# Patient Record
Sex: FEMALE | Race: WHITE | NOT HISPANIC OR LATINO | URBAN - METROPOLITAN AREA
[De-identification: names, ages, dates, MRNs, and addresses within clinical notes are randomized per-mention and may not be internally consistent; named-entity substitution may affect disease eponyms.]

---

## 2023-04-11 DIAGNOSIS — K21.9 CHRONIC GERD: ICD-10-CM

## 2023-04-11 RX ORDER — PANTOPRAZOLE SODIUM 40 MG/1
TABLET, DELAYED RELEASE ORAL
Qty: 90 TABLET | Refills: 0 | Status: SHIPPED | OUTPATIENT
Start: 2023-04-11 | End: 2023-07-19

## 2023-04-17 DIAGNOSIS — I10 HYPERTENSION, UNSPECIFIED TYPE: ICD-10-CM

## 2023-04-17 DIAGNOSIS — Z00.00 HEALTH CARE MAINTENANCE: ICD-10-CM

## 2023-04-17 RX ORDER — AMLODIPINE BESYLATE 5 MG/1
1 TABLET ORAL DAILY
COMMUNITY
Start: 2017-09-29 | End: 2023-04-17 | Stop reason: SDUPTHER

## 2023-04-17 RX ORDER — CITALOPRAM 20 MG/1
1 TABLET, FILM COATED ORAL DAILY
COMMUNITY
Start: 2014-05-23 | End: 2023-04-17 | Stop reason: SDUPTHER

## 2023-04-18 RX ORDER — CITALOPRAM 20 MG/1
20 TABLET, FILM COATED ORAL DAILY
Qty: 90 TABLET | Refills: 0 | Status: SHIPPED | OUTPATIENT
Start: 2023-04-18 | End: 2023-08-02 | Stop reason: SDUPTHER

## 2023-04-18 RX ORDER — AMLODIPINE BESYLATE 5 MG/1
5 TABLET ORAL DAILY
Qty: 90 TABLET | Refills: 0 | Status: SHIPPED | OUTPATIENT
Start: 2023-04-18 | End: 2023-08-02 | Stop reason: SDUPTHER

## 2023-07-11 DIAGNOSIS — K21.9 CHRONIC GERD: ICD-10-CM

## 2023-07-19 RX ORDER — PANTOPRAZOLE SODIUM 40 MG/1
TABLET, DELAYED RELEASE ORAL
Qty: 90 TABLET | Refills: 3 | Status: SHIPPED | OUTPATIENT
Start: 2023-07-19 | End: 2024-05-13

## 2023-07-31 ENCOUNTER — LAB (OUTPATIENT)
Dept: LAB | Facility: LAB | Age: 68
End: 2023-07-31
Payer: MEDICARE

## 2023-07-31 ENCOUNTER — OFFICE VISIT (OUTPATIENT)
Dept: PRIMARY CARE | Facility: CLINIC | Age: 68
End: 2023-07-31
Payer: MEDICARE

## 2023-07-31 VITALS
HEIGHT: 64 IN | SYSTOLIC BLOOD PRESSURE: 150 MMHG | DIASTOLIC BLOOD PRESSURE: 79 MMHG | BODY MASS INDEX: 34.31 KG/M2 | OXYGEN SATURATION: 98 % | HEART RATE: 64 BPM | WEIGHT: 201 LBS | TEMPERATURE: 96.9 F

## 2023-07-31 DIAGNOSIS — K21.9 GASTROESOPHAGEAL REFLUX DISEASE WITHOUT ESOPHAGITIS: ICD-10-CM

## 2023-07-31 DIAGNOSIS — Z12.31 ENCOUNTER FOR SCREENING MAMMOGRAM FOR MALIGNANT NEOPLASM OF BREAST: ICD-10-CM

## 2023-07-31 DIAGNOSIS — I10 HYPERTENSION, BENIGN: ICD-10-CM

## 2023-07-31 DIAGNOSIS — F33.2 DEPRESSION, ENDOGENOUS (MULTI): ICD-10-CM

## 2023-07-31 DIAGNOSIS — M81.0 POSTMENOPAUSAL BONE LOSS: ICD-10-CM

## 2023-07-31 DIAGNOSIS — Z00.00 VISIT FOR PREVENTIVE HEALTH EXAMINATION: ICD-10-CM

## 2023-07-31 DIAGNOSIS — Z00.00 VISIT FOR PREVENTIVE HEALTH EXAMINATION: Primary | ICD-10-CM

## 2023-07-31 PROBLEM — M51.379 DEGENERATION OF INTERVERTEBRAL DISC OF LUMBOSACRAL REGION: Status: ACTIVE | Noted: 2023-07-31

## 2023-07-31 PROBLEM — E78.5 HYPERLIPIDEMIA: Status: ACTIVE | Noted: 2023-07-31

## 2023-07-31 PROBLEM — F41.9 ANXIETY: Status: ACTIVE | Noted: 2023-07-31

## 2023-07-31 PROBLEM — G47.33 OSA (OBSTRUCTIVE SLEEP APNEA): Status: ACTIVE | Noted: 2023-07-31

## 2023-07-31 PROBLEM — M51.37 DEGENERATION OF INTERVERTEBRAL DISC OF LUMBOSACRAL REGION: Status: ACTIVE | Noted: 2023-07-31

## 2023-07-31 LAB
ALANINE AMINOTRANSFERASE (SGPT) (U/L) IN SER/PLAS: 14 U/L (ref 7–45)
ALBUMIN (G/DL) IN SER/PLAS: 4.2 G/DL (ref 3.4–5)
ALKALINE PHOSPHATASE (U/L) IN SER/PLAS: 109 U/L (ref 33–136)
ANION GAP IN SER/PLAS: 12 MMOL/L (ref 10–20)
ASPARTATE AMINOTRANSFERASE (SGOT) (U/L) IN SER/PLAS: 15 U/L (ref 9–39)
BILIRUBIN TOTAL (MG/DL) IN SER/PLAS: 0.7 MG/DL (ref 0–1.2)
CALCIUM (MG/DL) IN SER/PLAS: 9.6 MG/DL (ref 8.6–10.6)
CARBON DIOXIDE, TOTAL (MMOL/L) IN SER/PLAS: 28 MMOL/L (ref 21–32)
CHLORIDE (MMOL/L) IN SER/PLAS: 102 MMOL/L (ref 98–107)
CHOLESTEROL (MG/DL) IN SER/PLAS: 194 MG/DL (ref 0–199)
CHOLESTEROL IN HDL (MG/DL) IN SER/PLAS: 44.4 MG/DL
CHOLESTEROL/HDL RATIO: 4.4
CREATININE (MG/DL) IN SER/PLAS: 0.74 MG/DL (ref 0.5–1.05)
ERYTHROCYTE DISTRIBUTION WIDTH (RATIO) BY AUTOMATED COUNT: 16 % (ref 11.5–14.5)
ERYTHROCYTE MEAN CORPUSCULAR HEMOGLOBIN CONCENTRATION (G/DL) BY AUTOMATED: 29.1 G/DL (ref 32–36)
ERYTHROCYTE MEAN CORPUSCULAR VOLUME (FL) BY AUTOMATED COUNT: 66 FL (ref 80–100)
ERYTHROCYTES (10*6/UL) IN BLOOD BY AUTOMATED COUNT: 5.78 X10E12/L (ref 4–5.2)
GFR FEMALE: 88 ML/MIN/1.73M2
GLUCOSE (MG/DL) IN SER/PLAS: 97 MG/DL (ref 74–99)
HEMATOCRIT (%) IN BLOOD BY AUTOMATED COUNT: 38.2 % (ref 36–46)
HEMOGLOBIN (G/DL) IN BLOOD: 11.1 G/DL (ref 12–16)
LDL: 124 MG/DL (ref 0–99)
LEUKOCYTES (10*3/UL) IN BLOOD BY AUTOMATED COUNT: 7.4 X10E9/L (ref 4.4–11.3)
NRBC (PER 100 WBCS) BY AUTOMATED COUNT: 0 /100 WBC (ref 0–0)
PLATELETS (10*3/UL) IN BLOOD AUTOMATED COUNT: 337 X10E9/L (ref 150–450)
POTASSIUM (MMOL/L) IN SER/PLAS: 4.6 MMOL/L (ref 3.5–5.3)
PROTEIN TOTAL: 6.8 G/DL (ref 6.4–8.2)
SODIUM (MMOL/L) IN SER/PLAS: 137 MMOL/L (ref 136–145)
THYROTROPIN (MIU/L) IN SER/PLAS BY DETECTION LIMIT <= 0.05 MIU/L: 0.92 MIU/L (ref 0.44–3.98)
TRIGLYCERIDE (MG/DL) IN SER/PLAS: 130 MG/DL (ref 0–149)
UREA NITROGEN (MG/DL) IN SER/PLAS: 13 MG/DL (ref 6–23)
VLDL: 26 MG/DL (ref 0–40)

## 2023-07-31 PROCEDURE — 3077F SYST BP >= 140 MM HG: CPT | Performed by: FAMILY MEDICINE

## 2023-07-31 PROCEDURE — G0439 PPPS, SUBSEQ VISIT: HCPCS | Performed by: FAMILY MEDICINE

## 2023-07-31 PROCEDURE — 1160F RVW MEDS BY RX/DR IN RCRD: CPT | Performed by: FAMILY MEDICINE

## 2023-07-31 PROCEDURE — 1036F TOBACCO NON-USER: CPT | Performed by: FAMILY MEDICINE

## 2023-07-31 PROCEDURE — 1159F MED LIST DOCD IN RCRD: CPT | Performed by: FAMILY MEDICINE

## 2023-07-31 PROCEDURE — G0444 DEPRESSION SCREEN ANNUAL: HCPCS | Performed by: FAMILY MEDICINE

## 2023-07-31 PROCEDURE — 99214 OFFICE O/P EST MOD 30 MIN: CPT | Performed by: FAMILY MEDICINE

## 2023-07-31 PROCEDURE — 1157F ADVNC CARE PLAN IN RCRD: CPT | Performed by: FAMILY MEDICINE

## 2023-07-31 PROCEDURE — 36415 COLL VENOUS BLD VENIPUNCTURE: CPT

## 2023-07-31 PROCEDURE — 84443 ASSAY THYROID STIM HORMONE: CPT

## 2023-07-31 PROCEDURE — 3078F DIAST BP <80 MM HG: CPT | Performed by: FAMILY MEDICINE

## 2023-07-31 PROCEDURE — 1170F FXNL STATUS ASSESSED: CPT | Performed by: FAMILY MEDICINE

## 2023-07-31 PROCEDURE — 99497 ADVNCD CARE PLAN 30 MIN: CPT | Performed by: FAMILY MEDICINE

## 2023-07-31 PROCEDURE — 80053 COMPREHEN METABOLIC PANEL: CPT

## 2023-07-31 PROCEDURE — 85027 COMPLETE CBC AUTOMATED: CPT

## 2023-07-31 PROCEDURE — 80061 LIPID PANEL: CPT

## 2023-07-31 RX ORDER — ALBUTEROL SULFATE 90 UG/1
2 AEROSOL, METERED RESPIRATORY (INHALATION) EVERY 6 HOURS PRN
COMMUNITY
Start: 2022-10-29

## 2023-07-31 ASSESSMENT — LIFESTYLE VARIABLES
SKIP TO QUESTIONS 9-10: 1
HOW MANY STANDARD DRINKS CONTAINING ALCOHOL DO YOU HAVE ON A TYPICAL DAY: 1 OR 2
AUDIT-C TOTAL SCORE: 3
HOW OFTEN DO YOU HAVE A DRINK CONTAINING ALCOHOL: 2-3 TIMES A WEEK
HOW OFTEN DO YOU HAVE SIX OR MORE DRINKS ON ONE OCCASION: NEVER

## 2023-07-31 ASSESSMENT — ACTIVITIES OF DAILY LIVING (ADL)
BATHING: INDEPENDENT
DRESSING: INDEPENDENT
TAKING_MEDICATION: INDEPENDENT
GROCERY_SHOPPING: INDEPENDENT
DOING_HOUSEWORK: INDEPENDENT
MANAGING_FINANCES: INDEPENDENT

## 2023-07-31 ASSESSMENT — PATIENT HEALTH QUESTIONNAIRE - PHQ9
1. LITTLE INTEREST OR PLEASURE IN DOING THINGS: NOT AT ALL
2. FEELING DOWN, DEPRESSED OR HOPELESS: NOT AT ALL
SUM OF ALL RESPONSES TO PHQ9 QUESTIONS 1 AND 2: 0

## 2023-07-31 ASSESSMENT — ENCOUNTER SYMPTOMS
LOSS OF SENSATION IN FEET: 0
DEPRESSION: 0
OCCASIONAL FEELINGS OF UNSTEADINESS: 0

## 2023-07-31 NOTE — PROGRESS NOTES
Subjective   Patient ID: Zoe Rousseau is a 67 y.o. female who presents for Medicare Annual Wellness Visit Subsequent.    Assessment/Plan     Problem List Items Addressed This Visit       Depression, endogenous (CMS/HCC)    GERD (gastroesophageal reflux disease)    Hypertension, benign    Relevant Orders    TSH with reflex to Free T4 if abnormal    Lipid Panel    Comprehensive Metabolic Panel    CBC    Urinalysis with Reflex Microscopic     Other Visit Diagnoses       Visit for preventive health examination    -  Primary    Relevant Orders    TSH with reflex to Free T4 if abnormal    Lipid Panel    Comprehensive Metabolic Panel    CBC    Urinalysis with Reflex Microscopic    Encounter for screening mammogram for malignant neoplasm of breast        Relevant Orders    BI mammo bilateral screening tomosynthesis    Postmenopausal bone loss        Relevant Orders    XR DEXA bone density        Nuclear stress test done in July 2022 no acute abnormality low risk scan  Discussed about weight loss  Mammogram DEXA scan requisition provided       Discussed briefly about rectocele patient can take Metamucil if not improving patient is to see urology or her gynecologist     Discussed about diet exercise weight loss  Pneumovax on previous visit  Shingles vaccine advised to get it at pharmacy after getting covid vaccine  Mammogram March 2021 within normal limits  Bone density March 2021 showed osteopenia  Colon cancer screening colonoscopy in September 2020  Chronic lower back pain  Follow-up in 6-12 months come back early with any new signs and symptoms. Patient understands plan.    HPI    66-year-old female here c/o shortness of breath since last 4-5 weeks n no chest pain chest tightness orthopnea PND palpitation      Nuclear stress test within normal limits  Spirometry shows possible restriction     possible rectocele she has advised to see urology gynecology     Hypertension  Obesity BMI 34  Prediabetes   Anxiety  "  Hyperlipidemia   Vitamin D deficiency     Chronic low back pain     Following up with OB/GYN.  Planing of on and off chronic lower back pain without radiation no history of injury or trauma no weakness noting and numbness no bowel or bladder incontinence     Recently had a EGD colonoscopy done August 2018 with Dr. Huang    I spent >16 minutes discussing Advance Care Planning including the explanation and discussion of advance directives. If patient does not have current up to date documents, examples and information provided on how to create both Living Will and Power of . Patient was encouraged to work on completing these documents. . Patient has living will and her  is healthcare power of . Patient is full      No Known Allergies    Current Outpatient Medications   Medication Sig Dispense Refill    albuterol 90 mcg/actuation inhaler Inhale 2 puffs every 6 hours if needed.      amLODIPine (Norvasc) 5 mg tablet Take 1 tablet (5 mg) by mouth once daily. 90 tablet 0    citalopram (CeleXA) 20 mg tablet Take 1 tablet (20 mg) by mouth once daily. 90 tablet 0    pantoprazole (ProtoNix) 40 mg EC tablet TAKE 1 TABLET BY MOUTH DAILY 90 tablet 3     No current facility-administered medications for this visit.       Objective   Visit Vitals  /79 (BP Location: Left arm, Patient Position: Sitting)   Pulse 64   Temp 36.1 °C (96.9 °F)   Ht 1.626 m (5' 4\")   Wt 91.2 kg (201 lb)   SpO2 98%   BMI 34.50 kg/m²   Smoking Status Never   BSA 2.03 m²     Physical Exam  Constitutional:       General: He is not in acute distress.     Appearance: Normal appearance.   HENT:      Head: Normocephalic and atraumatic.      Nose: Nose normal.   Eyes:      Extraocular Movements: Extraocular movements intact.      Conjunctiva/sclera: Conjunctivae normal.   Cardiovascular:      Rate and Rhythm: Normal rate and regular rhythm.   Pulmonary:      Effort: Pulmonary effort is normal.      Breath sounds: Normal breath " sounds.   Skin:     General: Skin is warm.   Neurological:      Mental Status: He is alert and oriented to person, place, and time.   Psychiatric:         Mood and Affect: Mood normal.         Behavior: Behavior normal.   Immunization History   Administered Date(s) Administered    Pfizer Purple Cap SARS-CoV-2 01/13/2021, 02/03/2021, 10/01/2021       Review of Systems    No visits with results within 4 Month(s) from this visit.   Latest known visit with results is:   Legacy Encounter on 08/02/2022   Component Date Value Ref Range Status    Glucose 08/02/2022 89  74 - 99 mg/dL Final    Sodium 08/02/2022 136  136 - 145 mmol/L Final    Potassium 08/02/2022 4.1  3.5 - 5.3 mmol/L Final    Chloride 08/02/2022 102  98 - 107 mmol/L Final    Bicarbonate 08/02/2022 27  21 - 32 mmol/L Final    Anion Gap 08/02/2022 11  10 - 20 mmol/L Final    Urea Nitrogen 08/02/2022 21  6 - 23 mg/dL Final    Creatinine 08/02/2022 0.89  0.50 - 1.05 mg/dL Final    GFR Female 08/02/2022 71  >90 mL/min/1.73m2 Final    Calcium 08/02/2022 8.7  8.6 - 10.6 mg/dL Final    Retic % 08/02/2022 1.8  0.5 - 2.0 % Final    Retic Absolute 08/02/2022 0.100  0.017 - 0.110 x10E12/L Final    Immature Retic fraction 08/02/2022 10.7  0.0 - 16.0 % Final    Reticulocyte Hemoglobin 08/02/2022 22 (L)  28 - 38 pg Final    Iron 08/02/2022 65  35 - 150 ug/dL Final    TIBC 08/02/2022 309  240 - 445 ug/dL Final    Iron Saturation 08/02/2022 21 (L)  25 - 45 % Final    WBC 08/02/2022 9.4  4.4 - 11.3 x10E9/L Final    nRBC 08/02/2022 0.0  0.0 - 0.0 /100 WBC Final    RBC 08/02/2022 5.54 (H)  4.00 - 5.20 x10E12/L Final    Hemoglobin 08/02/2022 10.6 (L)  12.0 - 16.0 g/dL Final    Hematocrit 08/02/2022 36.1  36.0 - 46.0 % Final    MCV 08/02/2022 65 (L)  80 - 100 fL Final    MCHC 08/02/2022 29.4 (L)  32.0 - 36.0 g/dL Final    Platelets 08/02/2022 324  150 - 450 x10E9/L Final    RDW 08/02/2022 16.2 (H)  11.5 - 14.5 % Final    Ferritin 08/02/2022 111  8 - 150 ug/L Final       Radiology:  Reviewed imaging in powerchart.  No results found.    No family history on file.  Social History     Socioeconomic History    Marital status:      Spouse name: None    Number of children: None    Years of education: None    Highest education level: None   Occupational History    None   Tobacco Use    Smoking status: Never    Smokeless tobacco: Never   Substance and Sexual Activity    Alcohol use: Not Currently    Drug use: Never    Sexual activity: None   Other Topics Concern    None   Social History Narrative    None     Social Determinants of Health     Financial Resource Strain: Not on file   Food Insecurity: Not on file   Transportation Needs: Not on file   Physical Activity: Not on file   Stress: Not on file   Social Connections: Not on file   Intimate Partner Violence: Not on file   Housing Stability: Not on file     Past Medical History:   Diagnosis Date    Other intervertebral disc degeneration, lumbosacral region     DDD (degenerative disc disease), lumbosacral    Personal history of colonic polyps     History of colonic polyps    Personal history of diseases of the blood and blood-forming organs and certain disorders involving the immune mechanism     History of thalassemia    Personal history of other diseases of the circulatory system     History of valvular heart disease    Personal history of other diseases of the nervous system and sense organs     History of sciatica    Personal history of other specified conditions 03/25/2015    History of vertigo    Personal history of urinary (tract) infections 11/10/2015    History of urinary tract infection    Primary osteoarthritis, right ankle and foot     Osteoarthritis of both feet     Past Surgical History:   Procedure Laterality Date    CHOLECYSTECTOMY  03/13/2015    Cholecystectomy Laparoscopic    COLONOSCOPY  08/29/2018    Complete Colonoscopy    OTHER SURGICAL HISTORY  08/29/2018    Upper Gastrointestinal Endoscopy, Simple Primary Exam    OTHER  SURGICAL HISTORY  08/03/2015    Dental Surgery    TOTAL ABDOMINAL HYSTERECTOMY  08/03/2015    Total Abdominal Hysterectomy       Charting was completed using voice recognition technology and may include unintended errors.

## 2023-08-01 ENCOUNTER — TELEPHONE (OUTPATIENT)
Dept: PRIMARY CARE | Facility: CLINIC | Age: 68
End: 2023-08-01
Payer: MEDICARE

## 2023-08-01 DIAGNOSIS — I10 HYPERTENSION, UNSPECIFIED TYPE: ICD-10-CM

## 2023-08-01 DIAGNOSIS — Z00.00 HEALTH CARE MAINTENANCE: ICD-10-CM

## 2023-08-01 DIAGNOSIS — D50.8 OTHER IRON DEFICIENCY ANEMIA: ICD-10-CM

## 2023-08-02 NOTE — TELEPHONE ENCOUNTER
PT stated she is going to try and work on losing weight first before taking any medication.       Also she is wanting to remind you about the meds she needs refilled.

## 2023-08-03 RX ORDER — CITALOPRAM 20 MG/1
20 TABLET, FILM COATED ORAL DAILY
Qty: 90 TABLET | Refills: 2 | Status: SHIPPED | OUTPATIENT
Start: 2023-08-03 | End: 2024-03-20

## 2023-08-03 RX ORDER — AMLODIPINE BESYLATE 5 MG/1
5 TABLET ORAL DAILY
Qty: 90 TABLET | Refills: 2 | Status: SHIPPED | OUTPATIENT
Start: 2023-08-03 | End: 2024-03-20

## 2023-08-24 ENCOUNTER — PATIENT OUTREACH (OUTPATIENT)
Dept: CARE COORDINATION | Facility: CLINIC | Age: 68
End: 2023-08-24
Payer: MEDICARE

## 2024-01-12 ENCOUNTER — OFFICE VISIT (OUTPATIENT)
Dept: DERMATOLOGY | Facility: CLINIC | Age: 69
End: 2024-01-12
Payer: MEDICARE

## 2024-01-12 DIAGNOSIS — C44.311 BASAL CELL CARCINOMA (BCC) OF SKIN OF NOSE: ICD-10-CM

## 2024-01-12 PROCEDURE — 1160F RVW MEDS BY RX/DR IN RCRD: CPT | Performed by: DERMATOLOGY

## 2024-01-12 PROCEDURE — 17312 MOHS ADDL STAGE: CPT | Performed by: STUDENT IN AN ORGANIZED HEALTH CARE EDUCATION/TRAINING PROGRAM

## 2024-01-12 PROCEDURE — 15576 PEDICLE E/N/E/L/NTRORAL: CPT | Performed by: STUDENT IN AN ORGANIZED HEALTH CARE EDUCATION/TRAINING PROGRAM

## 2024-01-12 PROCEDURE — 1036F TOBACCO NON-USER: CPT | Performed by: DERMATOLOGY

## 2024-01-12 PROCEDURE — 17311 MOHS 1 STAGE H/N/HF/G: CPT | Performed by: STUDENT IN AN ORGANIZED HEALTH CARE EDUCATION/TRAINING PROGRAM

## 2024-01-12 NOTE — PROGRESS NOTES
Mohs Surgery Operative Note    Date of Surgery:  1/12/2024  Surgeon:  Jose F Bhandari MD PhD  Office Location:  [address]  [facility] [address]  Dept: [phone]  Dept Fax: [phone]  Loc: [phone]  Referring Provider: Anaid Salguero,   [address] [facility] [address], [facility] [address]  [place] [address]      Assessment/Plan   Pre-procedure:   Obtained informed consent: written from patient  The surgical site was identified and confirmed with the patient.     Intra-operative:   Audible time out called at : 8:50 AM 01/16/24  by: Jose F Bhandari MD PhD   Verified patient name, birthdate, site, specimen bottle label & requisition.    The planned procedure(s) was again reviewed with the patient. The risks of bleeding, infection, nerve damage and scarring were reviewed. Written authorization was obtained. The patient identity, surgical site, and planned procedure(s) were verified. The provider acted as both surgeon and pathologist.     Basal cell carcinoma (BCC) of skin of nose  Right Ala Nasi    Mohs surgery    Consent obtained: written    Universal Protocol:  Procedure explained and questions answered to patient or proxy's satisfaction: Yes    Test results available and properly labeled: Yes    Pathology report reviewed: Yes    External notes reviewed: Yes    Photo or diagram used for site identification: Yes    Site/side marked: Yes    Slide independently reviewed by Mohs surgeon: Yes    Immediately prior to procedure a time out was called: Yes    Patient identity confirmed: verbally with patient  Preparation: Patient was prepped and draped in usual sterile fashion      Anticoagulation:  Was the anticoagulation regimen changed prior to Mohs? No      Anesthesia:  Anesthesia method: local infiltration  Local anesthetic: lidocaine 1% WITH epi    Procedure Details:  Biopsy accession number: [id number]  Date of biopsy: 6/6/2023  Frozen section biopsy performed: No    Pre-Op  diagnosis: basal cell carcinoma  BCC subtype: nodular  Surgery side: right  Surgical site (from skin exam): Right Ala Nasi  Pre-operative length (cm): 0.3  Pre-operative width (cm): 0.3  Indications for Mohs surgery: anatomic location where tissue conservation is critical    Micrographic Surgery Details:  Post-operative length (cm): 1.4  Post-operative width (cm): 1.6  Number of Mohs stages: 3    Stage 1     Comments: The patient was brought into the operating room and placed in the procedure chair in the appropriate position.  The area positive by previous biopsy was identified and confirmed with the patient. The area of clinically obvious tumor was debulked using a curette and/or scalpel as needed. An incision was made following the Mohs approach through the skin. The specimen was taken to the lab, divided into 2 piece(s) and appropriately chromacoded and processed.    .  Tumor was seen on the deep and lateral margins as indicated on the on the Mohs map.  Micronodular basal cell carcinoma. Histologic examination revealed multiple small tumor aggregates of atypical basaloid cells with peripheral palisading.          Tumor features identified on Mohs section: basal carcinoma      Depth of invasion: subcutaneous fat    Stage 2     Comments: The area of positivity as noted on the Mohs map in the previous stage was identified and removed using the Mohs technique. The specimen was taken to the lab and appropriately chromacoded and processed in 1 piece(s).    .  Tumor was seen on both the lateral and deep margins as indicated on the on the Mohs map.  Micronodular basal cell carcinoma. Histologic examination revealed multiple small tumor aggregates of atypical basaloid cells with peripheral palisading.        Tumor features identified on Mohs section: basal carcinoma     Depth of invasion: subcutaneous fat    Stage 3     Comments: The area of positivity as noted on the Mohs map in the previous stage was identified and  removed using the Mohs technique. The specimen was taken to the lab and appropriately chromacoded and processed in 1 piece(s).               Tumor features identified on Mohs section: no tumor identified     Depth of invasion: subcutaneous fat    Patient tolerance of procedure: tolerated well, no immediate complications    Reconstruction:  Was the defect reconstructed? Yes    Was reconstruction performed by the same Mohs surgeon? Yes    Setting of reconstruction: outpatient office  When was reconstruction performed? same day  Type of reconstruction: flap  Type of flap: interpolation    Interpolation flap type: two-stage melolabial  Flap area (cm2): 17  Subcutaneous Layers (Deep Stitches)   Suture size:  5-0  Suture type:  Vicryl  Stitches:  Buried vertical mattress  Fine/surface layer approximation (top stitches)   Epidermal/Superficial suture size:  5-0  Epidermal/Superficial suture type:  Fast-absorbing gut  Stitches: simple running    Hemostasis achieved with: electrodesiccation  Outcome: patient tolerated procedure well with no complications    Post-procedure details: sterile dressing applied and wound care instructions given    Dressing type: petrolatum, Telfa pad and pressure dressing      Staff Communication: Dermatology Local Anesthesia: 1 % Lidocaine / Epinephrine - Amount: 34cc's          Cheek To Nose Interpolation Flap: Due to geometric and functional constraints, a flap reconstruction was performed to reconstruct the defect. This is not a cosmetic procedure and is stage one of a multistaged procedure. To that end, adjacent tissue was incised and carried over to close the defect in the following manner: Cheek to nose interpolation flap This is a planned two stage repair. The patient will return for take down of the flap. Using skin marking ink, an interpolation flap was designed to repair the defect and minimize functional and cosmetic distortion. Given the size, depth, and location of the defect on the  nose, it was felt that a pedicled flap was necessary to provide the best restoration of normal anatomy and function of the skin. The risks, benefits and likely outcome of the flap were discussed. The wound edges were refreshed to a 90 degree angle. The flap was cut and undermined extensively at the level of the subcutaneous plane. Standing cutaneous cones were removed using Burow's triangles. The flap was elevated and through closure of the secondary defect the flap was transposed into the primary defect. It was trimmed where needed for a more accurate fit. The deep tissue was brought together and affixed using multiple key deep sutures. The remainder of the flap was then sutured into place with epidermal sutures. The flap measured 5.8 x 3.0 cm2.       The final repair measured 5.8 x 3.0 cm2    Wound care was discussed, and the patient was given written post-operative wound care instructions.      The patient will follow up with Jose F Bhandari MD PhD as needed for any post operative problems or concerns, and will follow up with their primary dermatologist as scheduled.

## 2024-01-12 NOTE — PROGRESS NOTES
Office Visit Note  Date: 1/12/2024  Surgeon:  Jose F Bhandari MD PhD  Office Location:  [address]  [address]  [facility] [address]  Dept: [phone]  Dept Fax: [phone]  Loc: [phone]  Referring Provider: Anaid Salguero, DO  [address]  [address]  [place] [address]    Serenity Rousseau is a 68 y.o. female who presents for the following: MOHS Surgery (Right nasal ala, BCC)    According to the patient, the lesion has been present for approximately 6 months at the time of diagnosis.  The lesion is not causing symptoms.  The lesion has not been treated previously.    The patient does not have a pacemaker / defibrillator.  The patient does not have a heart valve / joint replacement.    The patient is not on blood thinners.  The patient does not have a history of hepatitis B or C.  The patient does not have a history of HIV.  The patient does not have a history of immunosuppression (e.g. organ transplantation, malignancy, medications)    Review of Systems:  No other skin or systemic complaints other than what is documented elsewhere in the note.    MEDICAL HISTORY: clinically relevant history including significant past medical history, medications and allergies was reviewed and documented in Epic.    Objective   Well appearing patient in no apparent distress; mood and affect are within normal limits.  Vital signs: See record.  Noted on the Right Ala Nasi  Is a 0.3 x 0.3 cm scar                              The patient confirmed the identified site.    Discussion:  The nature of the diagnosis was explained. The lesion is a skin cancer.  It has a risk of local growth and distant spread. The condition is associated with sun exposure.  Warning signs of non-melanoma skin cancer discussed. Patient was instructed to perform monthly self skin examination.  We recommended that the patient have regular full skin exams given an increased risk of  subsequent skin cancers. The patient was instructed to use sun protective behaviors including use of broad spectrum sunscreens and sun protective clothing to reduce risk of skin cancers.      Risks, benefits, side effects of Mohs surgery were discussed with patient and the patient voiced understanding.  It was explained that even though the cure rate of Mohs is very high it is not 100%. Risks of surgery including but not limited to bleeding, infection, numbness, nerve damage, and scar were reviewed.  Discussion included wound care requirements, activity restrictions, likely scar outcome and time to heal.     After Mohs surgery, the defect may need to be repaired surgically and the scar may be longer than the original lesion.  Reconstruction options, risks, and benefits were reviewed including second intention healing, linear repair (4-1 ratio was explained), local flaps, skin grafts, cartilage grafts and interpolation flaps (the need for multiple surgeries was explained). Possible outcomes were reviewed including likely scar appearance, failure of flap survival, infection, bleeding and the need for revision surgery.     The pathology was reviewed, the photograph was reviewed, and the referring physician's note was reviewed.    Patient elected for Mohs surgery.

## 2024-02-02 ENCOUNTER — OFFICE VISIT (OUTPATIENT)
Dept: DERMATOLOGY | Facility: CLINIC | Age: 69
End: 2024-02-02
Payer: MEDICARE

## 2024-02-02 DIAGNOSIS — C44.311 BASAL CELL CARCINOMA (BCC) OF RIGHT SIDE OF NOSE: Primary | ICD-10-CM

## 2024-02-02 PROCEDURE — 1036F TOBACCO NON-USER: CPT | Performed by: DERMATOLOGY

## 2024-02-02 PROCEDURE — 1157F ADVNC CARE PLAN IN RCRD: CPT | Performed by: DERMATOLOGY

## 2024-02-02 PROCEDURE — 15630 DELAY FLAP EYE/NOS/EAR/LIP: CPT | Performed by: STUDENT IN AN ORGANIZED HEALTH CARE EDUCATION/TRAINING PROGRAM

## 2024-02-02 NOTE — PROGRESS NOTES
Interpolated flap take-down Operative Note    Date of Surgery:  2/2/2024  Surgeon:  Jose F Bhandari MD PhD  Office Location: [address] [address]  Dept: [phone]  Dept Fax: [phone]  Referring Provider: Anaid Salguero, [address]  [address], [address] [place] [address]    Serenity Rousseau is a 68 y.o. female who presents for the following: Second stage of interpolation flap take-down    The patient does not have a pacemaker / defibrillator.  The patient does not have a heart valve / joint replacement.    The patient is not on blood thinners.   The patient does not have a history of hepatitis B or C.  The patient does not have a history of HIV.  The patient does not have a history of immunosuppression (e.g. organ transplantation, malignancy, medications)    The following portions of the chart were reviewed this encounter and updated as appropriate:         Assessment/Plan   Pre-procedure:   Obtained informed consent: written from patient  The surgical site was identified and confirmed with the patient.     Intra-operative:   Audible time out called at :  1500PM 02/02/24  by: Jose F Bhandari MD PhD   Verified patient name, birthdate, site, specimen bottle label & requisition.    The planned procedure(s) was again reviewed with the patient. The risks of bleeding, infection, nerve damage and scarring were reviewed. The patient identity, surgical site, and planned procedure(s) were verified.     Basal cell carcinoma (BCC) of right side of nose, s/p treatment with Mohs surgery and presenting for planned second stage of interpolation flap take-down  Right Ala Nasi    Skin excision    Informed consent: discussed and consent obtained    Timeout: patient name, date of birth, surgical site, and procedure verified    Procedure prep:  Patient was prepped and draped  Anesthesia: the lesion was anesthetized in a standard fashion     Anesthesia comment:  Intra-oral nerve block  Anesthetic:  1% lidocaine w/ epinephrine 1-100,000 local infiltration  Instrument used: #15 blade    Hemostasis achieved with: electrodesiccation    Outcome: patient tolerated procedure well with no complications    Post-procedure details: sterile dressing applied and wound care instructions given      Interpolation Take Down:  The patient returns for take down of a two stage interpolation flap. Using skin marking ink, an interpolation flap was designed to repair the defect and minimize functional and cosmetic distortion. Insertion of the pedicled flap was necessary to provide the best restoration of normal anatomy and function of the skin. The risks, benefits and likely outcome of the flap were discussed. A full thickness incision was made near the distal edge of the flap to separate the new flap from the pedicle. Hemostasis was achieved. The new flap was carefully incised and sculpted by removing tissue to create a flap that wound insert into the primary defect. When a good fit was obtained the flap was transposed into the defect. The flap edge was secured into place with multiple deep sutures. The remainder of the flap was then sutured into place with epidermal sutures. The flap measured 1 cm2.     The final linear repair measured 4.1 cm          Wound care was discussed, and the patient was given written post-operative wound care instructions.      The patient will follow up with Jose F Bhandari MD PhD as needed for any post operative problems or concerns, and will follow up with their primary dermatologist as scheduled.     as scheduled.

## 2024-02-13 ENCOUNTER — APPOINTMENT (OUTPATIENT)
Dept: DERMATOLOGY | Facility: CLINIC | Age: 69
End: 2024-02-13
Payer: MEDICARE

## 2024-03-01 ENCOUNTER — APPOINTMENT (OUTPATIENT)
Dept: DERMATOLOGY | Facility: CLINIC | Age: 69
End: 2024-03-01
Payer: MEDICARE

## 2024-03-13 ENCOUNTER — OFFICE VISIT (OUTPATIENT)
Dept: DERMATOLOGY | Facility: CLINIC | Age: 69
End: 2024-03-13
Payer: MEDICARE

## 2024-03-13 DIAGNOSIS — Z51.89 VISIT FOR WOUND CHECK: ICD-10-CM

## 2024-03-13 PROCEDURE — 1157F ADVNC CARE PLAN IN RCRD: CPT | Performed by: DERMATOLOGY

## 2024-03-13 PROCEDURE — 99024 POSTOP FOLLOW-UP VISIT: CPT | Performed by: DERMATOLOGY

## 2024-03-13 PROCEDURE — 1036F TOBACCO NON-USER: CPT | Performed by: DERMATOLOGY

## 2024-03-13 NOTE — PROGRESS NOTES
1. Visit for wound check  Right Ala Nasi - healing well, no further tx needed. Return with any concerns.

## 2024-03-15 DIAGNOSIS — Z00.00 HEALTH CARE MAINTENANCE: ICD-10-CM

## 2024-03-15 DIAGNOSIS — I10 HYPERTENSION, UNSPECIFIED TYPE: ICD-10-CM

## 2024-03-20 RX ORDER — AMLODIPINE BESYLATE 5 MG/1
5 TABLET ORAL DAILY
Qty: 90 TABLET | Refills: 1 | Status: SHIPPED | OUTPATIENT
Start: 2024-03-20 | End: 2024-05-02 | Stop reason: ALTCHOICE

## 2024-03-20 RX ORDER — CITALOPRAM 20 MG/1
20 TABLET, FILM COATED ORAL DAILY
Qty: 90 TABLET | Refills: 1 | Status: SHIPPED | OUTPATIENT
Start: 2024-03-20

## 2024-04-18 ENCOUNTER — APPOINTMENT (OUTPATIENT)
Dept: PRIMARY CARE | Facility: CLINIC | Age: 69
End: 2024-04-18
Payer: MEDICARE

## 2024-05-02 ENCOUNTER — OFFICE VISIT (OUTPATIENT)
Dept: PRIMARY CARE | Facility: CLINIC | Age: 69
End: 2024-05-02
Payer: MEDICARE

## 2024-05-02 VITALS
TEMPERATURE: 97.1 F | DIASTOLIC BLOOD PRESSURE: 91 MMHG | SYSTOLIC BLOOD PRESSURE: 151 MMHG | BODY MASS INDEX: 33.97 KG/M2 | HEART RATE: 69 BPM | OXYGEN SATURATION: 96 % | WEIGHT: 199 LBS | HEIGHT: 64 IN

## 2024-05-02 DIAGNOSIS — K21.9 GASTROESOPHAGEAL REFLUX DISEASE, UNSPECIFIED WHETHER ESOPHAGITIS PRESENT: ICD-10-CM

## 2024-05-02 DIAGNOSIS — I10 HYPERTENSION, BENIGN: Primary | ICD-10-CM

## 2024-05-02 DIAGNOSIS — F33.2 DEPRESSION, ENDOGENOUS (MULTI): ICD-10-CM

## 2024-05-02 PROCEDURE — 3080F DIAST BP >= 90 MM HG: CPT | Performed by: FAMILY MEDICINE

## 2024-05-02 PROCEDURE — 1160F RVW MEDS BY RX/DR IN RCRD: CPT | Performed by: FAMILY MEDICINE

## 2024-05-02 PROCEDURE — 3077F SYST BP >= 140 MM HG: CPT | Performed by: FAMILY MEDICINE

## 2024-05-02 PROCEDURE — 1157F ADVNC CARE PLAN IN RCRD: CPT | Performed by: FAMILY MEDICINE

## 2024-05-02 PROCEDURE — 1036F TOBACCO NON-USER: CPT | Performed by: FAMILY MEDICINE

## 2024-05-02 PROCEDURE — 99213 OFFICE O/P EST LOW 20 MIN: CPT | Performed by: FAMILY MEDICINE

## 2024-05-02 PROCEDURE — 1159F MED LIST DOCD IN RCRD: CPT | Performed by: FAMILY MEDICINE

## 2024-05-02 RX ORDER — AMLODIPINE AND OLMESARTAN MEDOXOMIL 5; 20 MG/1; MG/1
1 TABLET ORAL DAILY
Qty: 30 TABLET | Refills: 2 | Status: SHIPPED | OUTPATIENT
Start: 2024-05-02 | End: 2024-05-02 | Stop reason: SDUPTHER

## 2024-05-02 RX ORDER — AMLODIPINE AND OLMESARTAN MEDOXOMIL 5; 20 MG/1; MG/1
1 TABLET ORAL DAILY
Qty: 30 TABLET | Refills: 2 | Status: SHIPPED | OUTPATIENT
Start: 2024-05-02 | End: 2024-07-31

## 2024-05-02 NOTE — PROGRESS NOTES
Subjective   Patient ID: Zoe Rousseau is a 68 y.o. female who presents for Follow-up.    Assessment/Plan     Problem List Items Addressed This Visit       Depression, endogenous (Multi)    GERD (gastroesophageal reflux disease)    Hypertension, benign - Primary    Relevant Medications    amlodipine-olmesartan (Cindy) 5-20 mg tablet    Other Relevant Orders    Basic Metabolic Panel     Previous visit    Nuclear stress test done in July 2022 no acute abnormality low risk scan  Discussed about weight loss  Mammogram and   DEXA scan requisition provided       Discussed briefly about rectocele patient can take Metamucil if not improving patient is to see urology or her gynecologist     Discussed about diet exercise weight loss  Pneumovax on previous visit  Shingles vaccine advised to get it at pharmacy after getting covid vaccine  Mammogram March 2021 within normal limits  Bone density March 2021 showed osteopenia  Colon cancer screening colonoscopy in September 2020  Chronic lower back pain  Follow-up in 6-12 months come back early with any new signs and symptoms. Patient understands plan.    HPI    66-year-old female here today complaining of episode of GERD  Went to ER no ACS  Currently asymptomatic  Usually GI cocktail helps  Continue pantoprazole    Also complaining of possible rectal prolapse with on and off diarrhea constipation most likely IBS with constipation    Advised patient to follow-up with Dr. Crury get EGD colonoscopy    We may consider in future Amitiza or Linzess     Previous nuclear stress test within normal limits  Spirometry shows possible restriction       Hypertension  Obesity BMI 34  Prediabetes   Anxiety   Hyperlipidemia   Vitamin D deficiency     Chronic low back pain     Following up with OB/GYN.  Planing of on and off chronic lower back pain without radiation no history of injury or trauma no weakness noting and numbness no bowel or bladder incontinence     Recently had a EGD  "colonoscopy done in 2020 with Dr. Huang    I spent >16 minutes discussing Advance Care Planning including the explanation and discussion of advance directives. If patient does not have current up to date documents, examples and information provided on how to create both Living Will and Power of . Patient was encouraged to work on completing these documents. . Patient has living will and her  is healthcare power of . Patient is full          No Known Allergies    Current Outpatient Medications   Medication Sig Dispense Refill    albuterol 90 mcg/actuation inhaler Inhale 2 puffs every 6 hours if needed.      citalopram (CeleXA) 20 mg tablet TAKE 1 TABLET BY MOUTH ONCE  DAILY 90 tablet 1    pantoprazole (ProtoNix) 40 mg EC tablet TAKE 1 TABLET BY MOUTH DAILY 90 tablet 3    amlodipine-olmesartan (Cindy) 5-20 mg tablet Take 1 tablet by mouth once daily. 30 tablet 2     No current facility-administered medications for this visit.       Objective   Visit Vitals  BP (!) 151/91 (BP Location: Left arm, Patient Position: Sitting)   Pulse 69   Temp 36.2 °C (97.1 °F)   Ht 1.626 m (5' 4\")   Wt 90.3 kg (199 lb)   SpO2 96%   BMI 34.16 kg/m²   Smoking Status Never   BSA 2.02 m²     Physical Exam  Constitutional:       General: He is not in acute distress.     Appearance: Normal appearance.   HENT:      Head: Normocephalic and atraumatic.      Nose: Nose normal.   Eyes:      Extraocular Movements: Extraocular movements intact.      Conjunctiva/sclera: Conjunctivae normal.   Cardiovascular:      Rate and Rhythm: Normal rate and regular rhythm.   Pulmonary:      Effort: Pulmonary effort is normal.      Breath sounds: Normal breath sounds.   Skin:     General: Skin is warm.   Neurological:      Mental Status: He is alert and oriented to person, place, and time.   Psychiatric:         Mood and Affect: Mood normal.         Behavior: Behavior normal.   Immunization History   Administered Date(s) Administered    " Pfizer Gray Cap SARS-CoV-2 07/19/2022    Pfizer Purple Cap SARS-CoV-2 01/13/2021, 02/03/2021, 10/01/2021       Review of Systems    No visits with results within 4 Month(s) from this visit.   Latest known visit with results is:   Legacy Encounter on 09/06/2023   Component Date Value Ref Range Status    Pathology Report 09/06/2023    Final                    Value:Name MANJIT REYES                                                                                                   Accession #: [id number]            Pathologist:                   ABHIJIT SZYMANSKI MD  Date of Procedure:    9/6/2023  Date Received:          9/7/2023  Date Reported           9/8/2023  Submitting Physician:   UBALDO DALY DO  Location:                    [facility]  Other External #                                                                    FINAL DIAGNOSIS  SKIN, R NASAL ALA, SHAVE BIOPSY:  BASAL CELL CARCINOMA, NODULAR GROWTH PATTERN, PRESENT ON THE DEEP MARGIN.                  **Electronically Signed Out by ABHIJIT SZYMANSKI M.D.**                                                                                                                                                                                                                                                                                                                                                                                                                                                                                                                 Electronically Signed Out By ABHIJIT SZYMANSKI MD/[facility]  By the signature on this report, the individual or group listed as making the  Final Interpretation/Diagnosis certifies that they have reviewed this case.  Diagnostic interpretation performed at  [facility] [address] [facility],  [facility] [address]           Microscopic Description:  Microscopic analysis shows a discrete nodule of tumor that is associated  with  the epidermis. The carcinoma is composed of bland basaloid keratinocytes with  peripheral palisaded arrangement of nuclei.      Clinical History:  R/O BCC.  Shave biopsy.  ([facility])     Specimens Submitted As:  A: SKIN, R NASAL ALA     Gross Description:  Received in formalin is one tan-brown piece of skin measuring 4 x 3 x 1 mm.   Inked and embedded in toto.      mlz/9/7/2023               [facility]                            Dermatopathology Laboratory [place] [id number]        CONVERTED FINAL DIAGNOSIS 09/06/2023    Final                    Value:SKIN, R NASAL ALA, SHAVE BIOPSY:  BASAL CELL CARCINOMA, NODULAR GROWTH PATTERN, PRESENT ON THE DEEP MARGIN.                  **Electronically Signed Out by ABHIJIT SZYMANSKI M.D.**        CONVERTED CLINICAL DIAGNOSIS-HISTO* 09/06/2023 R/O BCC.  Shave biopsy.  ([facility])   Final    CONVERTED GROSS DESCRIPTION 09/06/2023    Final                    Value:Received in formalin is one tan-brown piece of skin measuring 4 x 3 x 1 mm.   Inked and embedded in toto.        CONVERTED MICROSCOPIC DESCRIPTION 09/06/2023    Final                    Value:Microscopic analysis shows a discrete nodule of tumor that is associated with  the epidermis. The carcinoma is composed of bland basaloid keratinocytes with  peripheral palisaded arrangement of nuclei.        CONVERTED FINAL REPORT PDF LINK TO* 09/06/2023 \\copathshare\copath\PDF 2022_Feb\[id number]   Final       Radiology: Reviewed imaging in powerchart.  No results found.    No family history on file.  Social History     Socioeconomic History    Marital status:      Spouse name: None    Number of children: None    Years of education: None    Highest education level: None   Occupational History    None   Tobacco Use    Smoking status: Never    Smokeless tobacco: Never   Substance and Sexual Activity    Alcohol use: Not Currently    Drug  use: Never    Sexual activity: None   Other Topics Concern    None   Social History Narrative    None     Social Determinants of Health     Financial Resource Strain: Not on file   Food Insecurity: Not on file   Transportation Needs: Not on file   Physical Activity: Not on file   Stress: Not on file   Social Connections: Not on file   Intimate Partner Violence: Not on file   Housing Stability: Not on file     Past Medical History:   Diagnosis Date    Other intervertebral disc degeneration, lumbosacral region     DDD (degenerative disc disease), lumbosacral    Personal history of colonic polyps     History of colonic polyps    Personal history of diseases of the blood and blood-forming organs and certain disorders involving the immune mechanism     History of thalassemia    Personal history of other diseases of the circulatory system     History of valvular heart disease    Personal history of other diseases of the nervous system and sense organs     History of sciatica    Personal history of other specified conditions 03/25/2015    History of vertigo    Personal history of urinary (tract) infections 11/10/2015    History of urinary tract infection    Primary osteoarthritis, right ankle and foot     Osteoarthritis of both feet     Past Surgical History:   Procedure Laterality Date    CHOLECYSTECTOMY  03/13/2015    Cholecystectomy Laparoscopic    COLONOSCOPY  08/29/2018    Complete Colonoscopy    OTHER SURGICAL HISTORY  08/29/2018    Upper Gastrointestinal Endoscopy, Simple Primary Exam    OTHER SURGICAL HISTORY  08/03/2015    Dental Surgery    TOTAL ABDOMINAL HYSTERECTOMY  08/03/2015    Total Abdominal Hysterectomy       Charting was completed using voice recognition technology and may include unintended errors.

## 2024-05-12 DIAGNOSIS — K21.9 CHRONIC GERD: ICD-10-CM

## 2024-05-13 RX ORDER — PANTOPRAZOLE SODIUM 40 MG/1
TABLET, DELAYED RELEASE ORAL
Qty: 90 TABLET | Refills: 2 | Status: SHIPPED | OUTPATIENT
Start: 2024-05-13

## 2024-06-14 ENCOUNTER — APPOINTMENT (OUTPATIENT)
Dept: DERMATOLOGY | Facility: CLINIC | Age: 69
End: 2024-06-14
Payer: MEDICARE

## 2024-08-05 DIAGNOSIS — Z00.00 HEALTH CARE MAINTENANCE: ICD-10-CM

## 2024-08-22 RX ORDER — CITALOPRAM 20 MG/1
20 TABLET, FILM COATED ORAL DAILY
Qty: 90 TABLET | Refills: 0 | Status: SHIPPED | OUTPATIENT
Start: 2024-08-22

## 2024-09-05 ENCOUNTER — APPOINTMENT (OUTPATIENT)
Dept: PRIMARY CARE | Facility: CLINIC | Age: 69
End: 2024-09-05
Payer: MEDICARE

## 2024-09-05 VITALS
WEIGHT: 203 LBS | BODY MASS INDEX: 34.66 KG/M2 | SYSTOLIC BLOOD PRESSURE: 141 MMHG | HEIGHT: 64 IN | OXYGEN SATURATION: 97 % | HEART RATE: 76 BPM | DIASTOLIC BLOOD PRESSURE: 80 MMHG

## 2024-09-05 DIAGNOSIS — Z12.31 ENCOUNTER FOR SCREENING MAMMOGRAM FOR MALIGNANT NEOPLASM OF BREAST: ICD-10-CM

## 2024-09-05 DIAGNOSIS — I10 HYPERTENSION, BENIGN: ICD-10-CM

## 2024-09-05 DIAGNOSIS — Z13.820 ENCOUNTER FOR OSTEOPOROSIS SCREENING IN ASYMPTOMATIC POSTMENOPAUSAL PATIENT: ICD-10-CM

## 2024-09-05 DIAGNOSIS — F33.2 DEPRESSION, ENDOGENOUS (MULTI): ICD-10-CM

## 2024-09-05 DIAGNOSIS — F41.9 ANXIETY: ICD-10-CM

## 2024-09-05 DIAGNOSIS — Z78.0 ENCOUNTER FOR OSTEOPOROSIS SCREENING IN ASYMPTOMATIC POSTMENOPAUSAL PATIENT: ICD-10-CM

## 2024-09-05 DIAGNOSIS — Z00.00 VISIT FOR PREVENTIVE HEALTH EXAMINATION: Primary | ICD-10-CM

## 2024-09-05 PROCEDURE — 1160F RVW MEDS BY RX/DR IN RCRD: CPT | Performed by: FAMILY MEDICINE

## 2024-09-05 PROCEDURE — 1157F ADVNC CARE PLAN IN RCRD: CPT | Performed by: FAMILY MEDICINE

## 2024-09-05 PROCEDURE — 3008F BODY MASS INDEX DOCD: CPT | Performed by: FAMILY MEDICINE

## 2024-09-05 PROCEDURE — 1123F ACP DISCUSS/DSCN MKR DOCD: CPT | Performed by: FAMILY MEDICINE

## 2024-09-05 PROCEDURE — G0439 PPPS, SUBSEQ VISIT: HCPCS | Performed by: FAMILY MEDICINE

## 2024-09-05 PROCEDURE — 1170F FXNL STATUS ASSESSED: CPT | Performed by: FAMILY MEDICINE

## 2024-09-05 PROCEDURE — 3079F DIAST BP 80-89 MM HG: CPT | Performed by: FAMILY MEDICINE

## 2024-09-05 PROCEDURE — 1159F MED LIST DOCD IN RCRD: CPT | Performed by: FAMILY MEDICINE

## 2024-09-05 PROCEDURE — 99497 ADVNCD CARE PLAN 30 MIN: CPT | Performed by: FAMILY MEDICINE

## 2024-09-05 PROCEDURE — 3077F SYST BP >= 140 MM HG: CPT | Performed by: FAMILY MEDICINE

## 2024-09-05 PROCEDURE — 99214 OFFICE O/P EST MOD 30 MIN: CPT | Performed by: FAMILY MEDICINE

## 2024-09-05 PROCEDURE — 1036F TOBACCO NON-USER: CPT | Performed by: FAMILY MEDICINE

## 2024-09-05 PROCEDURE — G0444 DEPRESSION SCREEN ANNUAL: HCPCS | Performed by: FAMILY MEDICINE

## 2024-09-05 RX ORDER — AMLODIPINE BESYLATE 5 MG/1
5 TABLET ORAL DAILY
Qty: 30 TABLET | Refills: 0 | Status: SHIPPED | OUTPATIENT
Start: 2024-09-05 | End: 2024-09-05 | Stop reason: SDUPTHER

## 2024-09-05 RX ORDER — AMLODIPINE BESYLATE 5 MG/1
5 TABLET ORAL DAILY
Qty: 90 TABLET | Refills: 3 | Status: SHIPPED | OUTPATIENT
Start: 2024-09-05 | End: 2025-09-05

## 2024-09-05 ASSESSMENT — ACTIVITIES OF DAILY LIVING (ADL)
GROCERY_SHOPPING: INDEPENDENT
DOING_HOUSEWORK: INDEPENDENT
MANAGING_FINANCES: INDEPENDENT
DRESSING: INDEPENDENT
BATHING: INDEPENDENT
TAKING_MEDICATION: INDEPENDENT

## 2024-09-05 ASSESSMENT — PATIENT HEALTH QUESTIONNAIRE - PHQ9
SUM OF ALL RESPONSES TO PHQ9 QUESTIONS 1 AND 2: 0
2. FEELING DOWN, DEPRESSED OR HOPELESS: NOT AT ALL
1. LITTLE INTEREST OR PLEASURE IN DOING THINGS: NOT AT ALL
1. LITTLE INTEREST OR PLEASURE IN DOING THINGS: NOT AT ALL
2. FEELING DOWN, DEPRESSED OR HOPELESS: NOT AT ALL
SUM OF ALL RESPONSES TO PHQ9 QUESTIONS 1 AND 2: 0

## 2024-09-05 NOTE — PROGRESS NOTES
Subjective   Patient ID: Zoe Rousseau is a 68 y.o. female who presents for Medicare Annual Wellness Visit Subsequent.    Assessment/Plan     Problem List Items Addressed This Visit    None    Time spent around 10 minutes obtaining and discussing depression screening using PHQ 9 questions with results documented in the chart  Declined flu vaccine    Nuclear stress test done in July 2022 no acute abnormality low risk scan  Discussed about weight loss  Mammogram and   DEXA scan requisition provided  Previously patient did not go    Discussed briefly about rectocele patient can take Metamucil if not improving patient is to see urology or her gynecologist     Discussed about diet exercise weight loss  Pneumovax on previous visit  Shingles vaccine advised to get it at pharmacy after getting covid vaccine  Mammogram March 2021 within normal limits  Bone density March 2021 showed osteopenia  Colon cancer screening colonoscopy in September 2020 information provided  Chronic lower back pain  Follow-up in 6-12 months come back early with any new signs and symptoms. Patient understands plan.    HPI    68-year-old female here here for Medicare wellness visit and follow-up on chronic medical conditions      Also complaining of possible rectal prolapse with on and off diarrhea constipation most likely IBS with constipation    Advised patient to follow-up with Dr. Curry get EGD colonoscopy    Stop amlodipine olmesartan secondary to side effect nausea  Only on amlodipine 5 mg  Will provide refillPrevious nuclear stress test within normal limits  Spirometry shows possible restriction       Hypertension  Obesity BMI 34  Prediabetes   Anxiety   Hyperlipidemia   Vitamin D deficiency     Chronic low back pain     Following up with OB/GYN.  Planing of on and off chronic lower back pain without radiation no history of injury or trauma no weakness noting and numbness no bowel or bladder incontinence     Recently had a EGD  "colonoscopy done in 2020 with Dr. Huang    I spent >16 minutes discussing Advance Care Planning including the explanation and discussion of advance directives. If patient does not have current up to date documents, examples and information provided on how to create both Living Will and Power of . Patient was encouraged to work on completing these documents. . Patient has living will and her  is healthcare power of . Patient is full          No Known Allergies    Current Outpatient Medications   Medication Sig Dispense Refill    albuterol 90 mcg/actuation inhaler Inhale 2 puffs every 6 hours if needed.      citalopram (CeleXA) 20 mg tablet TAKE 1 TABLET BY MOUTH ONCE  DAILY 90 tablet 0    pantoprazole (ProtoNix) 40 mg EC tablet TAKE 1 TABLET BY MOUTH DAILY 90 tablet 2    amlodipine-olmesartan (Cindy) 5-20 mg tablet Take 1 tablet by mouth once daily. 30 tablet 2     No current facility-administered medications for this visit.       Objective   Visit Vitals  /80 (BP Location: Left arm, Patient Position: Sitting)   Pulse 76   Ht 1.626 m (5' 4\")   SpO2 97%   BMI 34.16 kg/m²   Smoking Status Never   BSA 2.02 m²     Physical Exam  Constitutional:       General: He is not in acute distress.     Appearance: Normal appearance.   HENT:      Head: Normocephalic and atraumatic.      Nose: Nose normal.   Eyes:      Extraocular Movements: Extraocular movements intact.      Conjunctiva/sclera: Conjunctivae normal.   Cardiovascular:      Rate and Rhythm: Normal rate and regular rhythm.   Pulmonary:      Effort: Pulmonary effort is normal.      Breath sounds: Normal breath sounds.   Skin:     General: Skin is warm.   Neurological:      Mental Status: He is alert and oriented to person, place, and time.   Psychiatric:         Mood and Affect: Mood normal.         Behavior: Behavior normal.   Immunization History   Administered Date(s) Administered    Flu vaccine, quadrivalent, high-dose, preservative " free, age 65y+ (FLUZONE) 10/12/2022    Flu vaccine, trivalent, preservative free, age 6 months and greater (Fluarix/Fluzone/Flulaval) 10/03/2014, 10/01/2015    Influenza, Seasonal, Quadrivalent, Adjuvanted 10/28/2021    Pfizer Gray Cap SARS-CoV-2 07/19/2022    Pfizer Purple Cap SARS-CoV-2 01/13/2021, 02/03/2021, 10/01/2021    Pneumococcal polysaccharide vaccine, 23-valent, age 2 years and older (PNEUMOVAX 23) 01/28/2022       Review of Systems    No visits with results within 4 Month(s) from this visit.   Latest known visit with results is:   Legacy Encounter on 09/06/2023   Component Date Value Ref Range Status    Pathology Report 09/06/2023    Final                    Value:Name MANJIT REYES                                                                                                   Accession #: [id number]            Pathologist:                   ABHIJIT SZYMANSKI MD  Date of Procedure:    9/6/2023  Date Received:          9/7/2023  Date Reported           9/8/2023  Submitting Physician:   UBALDO DALY DO  Location:                    [facility]  Other External #                                                                    FINAL DIAGNOSIS  SKIN, R NASAL ALA, SHAVE BIOPSY:  BASAL CELL CARCINOMA, NODULAR GROWTH PATTERN, PRESENT ON THE DEEP MARGIN.                  **Electronically Signed Out by ABHIJIT SZYMANSKI M.D.**                                                                                                                                                                                                                                                                                                                                                                                                                                                                                                                 Electronically Signed Out By ABHIJIT SZYMANSKI MD/[facility]  By the signature on this report, the individual  or group listed as making the  Final Interpretation/Diagnosis certifies that they have reviewed this case.  Diagnostic interpretation performed at  Dermatopath Lab [address] [place], [place] [id number],  [facility] [address]           Microscopic Description:  Microscopic analysis shows a discrete nodule of tumor that is associated with  the epidermis. The carcinoma is composed of bland basaloid keratinocytes with  peripheral palisaded arrangement of nuclei.      Clinical History:  R/O BCC.  Shave biopsy.  ([facility])     Specimens Submitted As:  A: SKIN, R NASAL ALA     Gross Description:  Received in formalin is one tan-brown piece of skin measuring 4 x 3 x 1 mm.   Inked and embedded in toto.      [facility]/9/7/2023               [facility]                            Dermatopathology Laboratory [place] [id number]  [id number] [place] [address]        CONVERTED FINAL DIAGNOSIS 09/06/2023    Final                    Value:SKIN, R NASAL ALA, SHAVE BIOPSY:  BASAL CELL CARCINOMA, NODULAR GROWTH PATTERN, PRESENT ON THE DEEP MARGIN.                  **Electronically Signed Out by ABHIJIT SZYMANSKI M.D.**        CONVERTED CLINICAL DIAGNOSIS-HISTO* 09/06/2023 R/O BCC.  Shave biopsy.  ([facility])   Final    CONVERTED GROSS DESCRIPTION 09/06/2023    Final                    Value:Received in formalin is one tan-brown piece of skin measuring 4 x 3 x 1 mm.   Inked and embedded in toto.        CONVERTED MICROSCOPIC DESCRIPTION 09/06/2023    Final                    Value:Microscopic analysis shows a discrete nodule of tumor that is associated with  the epidermis. The carcinoma is composed of bland basaloid keratinocytes with  peripheral palisaded arrangement of nuclei.        CONVERTED FINAL REPORT PDF LINK TO* 09/06/2023 \\copathshare\copath\PDF 2022_Feb\[id number]   Final       Radiology: Reviewed imaging in powerchart.  No results found.    No family history on file.  Social History      Socioeconomic History    Marital status:    Tobacco Use    Smoking status: Never    Smokeless tobacco: Never   Substance and Sexual Activity    Alcohol use: Not Currently    Drug use: Never     Past Medical History:   Diagnosis Date    Other intervertebral disc degeneration, lumbosacral region     DDD (degenerative disc disease), lumbosacral    Personal history of colonic polyps     History of colonic polyps    Personal history of diseases of the blood and blood-forming organs and certain disorders involving the immune mechanism     History of thalassemia    Personal history of other diseases of the circulatory system     History of valvular heart disease    Personal history of other diseases of the nervous system and sense organs     History of sciatica    Personal history of other specified conditions 03/25/2015    History of vertigo    Personal history of urinary (tract) infections 11/10/2015    History of urinary tract infection    Primary osteoarthritis, right ankle and foot     Osteoarthritis of both feet     Past Surgical History:   Procedure Laterality Date    CHOLECYSTECTOMY  03/13/2015    Cholecystectomy Laparoscopic    COLONOSCOPY  08/29/2018    Complete Colonoscopy    OTHER SURGICAL HISTORY  08/29/2018    Upper Gastrointestinal Endoscopy, Simple Primary Exam    OTHER SURGICAL HISTORY  08/03/2015    Dental Surgery    TOTAL ABDOMINAL HYSTERECTOMY  08/03/2015    Total Abdominal Hysterectomy       Charting was completed using voice recognition technology and may include unintended errors.

## 2024-09-30 ENCOUNTER — APPOINTMENT (OUTPATIENT)
Dept: RADIOLOGY | Facility: CLINIC | Age: 69
End: 2024-09-30
Payer: MEDICARE

## 2024-10-03 ENCOUNTER — HOSPITAL ENCOUNTER (OUTPATIENT)
Dept: RADIOLOGY | Facility: CLINIC | Age: 69
Discharge: HOME | End: 2024-10-03
Payer: MEDICARE

## 2024-10-03 DIAGNOSIS — Z00.00 VISIT FOR PREVENTIVE HEALTH EXAMINATION: ICD-10-CM

## 2024-10-03 DIAGNOSIS — Z12.31 ENCOUNTER FOR SCREENING MAMMOGRAM FOR MALIGNANT NEOPLASM OF BREAST: ICD-10-CM

## 2024-10-03 PROCEDURE — 77067 SCR MAMMO BI INCL CAD: CPT

## 2024-10-03 PROCEDURE — 77067 SCR MAMMO BI INCL CAD: CPT | Performed by: RADIOLOGY

## 2024-10-03 PROCEDURE — 77063 BREAST TOMOSYNTHESIS BI: CPT | Performed by: RADIOLOGY

## 2024-10-04 ENCOUNTER — HOSPITAL ENCOUNTER (OUTPATIENT)
Dept: RADIOLOGY | Facility: EXTERNAL LOCATION | Age: 69
Discharge: HOME | End: 2024-10-04

## 2024-10-16 DIAGNOSIS — Z00.00 HEALTH CARE MAINTENANCE: ICD-10-CM

## 2024-10-18 RX ORDER — CITALOPRAM 20 MG/1
20 TABLET, FILM COATED ORAL DAILY
Qty: 90 TABLET | Refills: 2 | Status: SHIPPED | OUTPATIENT
Start: 2024-10-18

## 2024-12-31 DIAGNOSIS — K21.9 CHRONIC GERD: ICD-10-CM

## 2025-01-02 RX ORDER — PANTOPRAZOLE SODIUM 40 MG/1
TABLET, DELAYED RELEASE ORAL
Qty: 90 TABLET | Refills: 2 | Status: SHIPPED | OUTPATIENT
Start: 2025-01-02

## 2025-01-28 DIAGNOSIS — I10 HYPERTENSION, BENIGN: ICD-10-CM

## 2025-01-28 DIAGNOSIS — Z00.00 HEALTH CARE MAINTENANCE: ICD-10-CM

## 2025-01-28 DIAGNOSIS — K21.9 CHRONIC GERD: ICD-10-CM

## 2025-01-28 RX ORDER — CITALOPRAM 20 MG/1
20 TABLET, FILM COATED ORAL DAILY
Qty: 90 TABLET | Refills: 1 | Status: SHIPPED | OUTPATIENT
Start: 2025-01-28

## 2025-01-28 RX ORDER — PANTOPRAZOLE SODIUM 40 MG/1
40 TABLET, DELAYED RELEASE ORAL DAILY
Qty: 90 TABLET | Refills: 1 | Status: SHIPPED | OUTPATIENT
Start: 2025-01-28

## 2025-01-28 RX ORDER — AMLODIPINE BESYLATE 5 MG/1
5 TABLET ORAL DAILY
Qty: 90 TABLET | Refills: 1 | Status: SHIPPED | OUTPATIENT
Start: 2025-01-28 | End: 2026-01-28

## 2025-05-16 ENCOUNTER — APPOINTMENT (OUTPATIENT)
Dept: PRIMARY CARE | Facility: CLINIC | Age: 70
End: 2025-05-16
Payer: MEDICARE

## 2025-05-22 ENCOUNTER — APPOINTMENT (OUTPATIENT)
Dept: PRIMARY CARE | Facility: CLINIC | Age: 70
End: 2025-05-22
Payer: MEDICARE

## 2025-05-22 VITALS
HEIGHT: 64 IN | BODY MASS INDEX: 32.44 KG/M2 | OXYGEN SATURATION: 97 % | TEMPERATURE: 97.5 F | HEART RATE: 60 BPM | WEIGHT: 190 LBS | SYSTOLIC BLOOD PRESSURE: 140 MMHG | DIASTOLIC BLOOD PRESSURE: 80 MMHG

## 2025-05-22 DIAGNOSIS — I10 HYPERTENSION, BENIGN: ICD-10-CM

## 2025-05-22 DIAGNOSIS — A09 DIARRHEA OF INFECTIOUS ORIGIN: Primary | ICD-10-CM

## 2025-05-22 DIAGNOSIS — Z00.00 HEALTH CARE MAINTENANCE: ICD-10-CM

## 2025-05-22 DIAGNOSIS — F33.2 MAJOR DEPRESSIVE DISORDER, RECURRENT SEVERE WITHOUT PSYCHOTIC FEATURES (MULTI): ICD-10-CM

## 2025-05-22 PROCEDURE — 3077F SYST BP >= 140 MM HG: CPT | Performed by: FAMILY MEDICINE

## 2025-05-22 PROCEDURE — 1160F RVW MEDS BY RX/DR IN RCRD: CPT | Performed by: FAMILY MEDICINE

## 2025-05-22 PROCEDURE — 99214 OFFICE O/P EST MOD 30 MIN: CPT | Performed by: FAMILY MEDICINE

## 2025-05-22 PROCEDURE — 3008F BODY MASS INDEX DOCD: CPT | Performed by: FAMILY MEDICINE

## 2025-05-22 PROCEDURE — 1036F TOBACCO NON-USER: CPT | Performed by: FAMILY MEDICINE

## 2025-05-22 PROCEDURE — 1157F ADVNC CARE PLAN IN RCRD: CPT | Performed by: FAMILY MEDICINE

## 2025-05-22 PROCEDURE — 1123F ACP DISCUSS/DSCN MKR DOCD: CPT | Performed by: FAMILY MEDICINE

## 2025-05-22 PROCEDURE — 1159F MED LIST DOCD IN RCRD: CPT | Performed by: FAMILY MEDICINE

## 2025-05-22 PROCEDURE — 3079F DIAST BP 80-89 MM HG: CPT | Performed by: FAMILY MEDICINE

## 2025-05-22 RX ORDER — AMLODIPINE BESYLATE 5 MG/1
5 TABLET ORAL DAILY
Qty: 90 TABLET | Refills: 1 | Status: SHIPPED | OUTPATIENT
Start: 2025-05-22 | End: 2026-05-22

## 2025-05-22 RX ORDER — CIPROFLOXACIN 500 MG/1
TABLET, FILM COATED ORAL
COMMUNITY
Start: 2025-05-09 | End: 2025-05-22 | Stop reason: ALTCHOICE

## 2025-05-22 RX ORDER — PHENAZOPYRIDINE HYDROCHLORIDE 200 MG/1
TABLET, FILM COATED ORAL
COMMUNITY
Start: 2025-03-29 | End: 2025-05-22 | Stop reason: ALTCHOICE

## 2025-05-22 RX ORDER — CITALOPRAM 20 MG/1
20 TABLET ORAL DAILY
Qty: 90 TABLET | Refills: 1 | Status: SHIPPED | OUTPATIENT
Start: 2025-05-22

## 2025-05-22 RX ORDER — METRONIDAZOLE 500 MG/1
TABLET ORAL
COMMUNITY
Start: 2025-05-09 | End: 2025-05-22 | Stop reason: ALTCHOICE

## 2025-05-22 ASSESSMENT — LIFESTYLE VARIABLES
HOW OFTEN DO YOU HAVE A DRINK CONTAINING ALCOHOL: NEVER
HOW OFTEN DO YOU HAVE SIX OR MORE DRINKS ON ONE OCCASION: NEVER
AUDIT-C TOTAL SCORE: 0
HOW MANY STANDARD DRINKS CONTAINING ALCOHOL DO YOU HAVE ON A TYPICAL DAY: PATIENT DOES NOT DRINK
SKIP TO QUESTIONS 9-10: 1

## 2025-05-22 NOTE — PROGRESS NOTES
"Subjective   Patient ID: Zoe Rousseau is a 69 y.o. female who presents for Hospital Follow-up.    Assessment/Plan     Problem List Items Addressed This Visit    None  Visit Diagnoses         Diarrhea of infectious origin    -  Primary            HPI  69-year-old female was admitted with acute diarrhea found to be Salmonella positive treated with IV hydration IV antibiotic with Cipro and Flagyl initially  As patient improved was discharged on Cipro and Flagyl  ID was consulted  Send also had a UTI  Currently asymptomatic  Bowel movements improving    Had acute renal failure from dehydration has improved  Will check CBC BMP today  Complete antibiotic course    Will follow-up in 6 months for physical consider fasting lab work at that time    Outside iron deficiency anemia received IV iron  History of colonoscopy in 2020 by Dr. coles had a AV malformation status post ablation  Needs refill on medications    Allergies[1]    Current Medications[2]    Objective   Visit Vitals  /80 (BP Location: Left arm, Patient Position: Sitting)   Pulse 60   Temp 36.4 °C (97.5 °F)   Ht 1.626 m (5' 4\")   Wt 86.2 kg (190 lb)   SpO2 97%   BMI 32.61 kg/m²   OB Status Postmenopausal   Smoking Status Never   BSA 1.97 m²     Physical Exam  Constitutional:       General: He is not in acute distress.     Appearance: Normal appearance.   HENT:      Head: Normocephalic and atraumatic.      Nose: Nose normal.   Eyes:      Extraocular Movements: Extraocular movements intact.      Conjunctiva/sclera: Conjunctivae normal.   Cardiovascular:      Rate and Rhythm: Normal rate and regular rhythm.   Pulmonary:      Effort: Pulmonary effort is normal.      Breath sounds: Normal breath sounds.   Skin:     General: Skin is warm.   Neurological:      Mental Status: He is alert and oriented to person, place, and time.   Psychiatric:         Mood and Affect: Mood normal.         Behavior: Behavior normal.   Immunization History   Administered " Date(s) Administered    COVID-19, mRNA, LNP-S, PF, 30 mcg/0.3 mL dose 01/13/2021, 02/03/2021, 10/01/2021    Flu vaccine, quadrivalent, high-dose, preservative free, age 65y+ (FLUZONE) 10/12/2022    Flu vaccine, trivalent, preservative free, age 6 months and greater (Fluarix/Fluzone/Flulaval) 10/03/2014, 10/01/2015    Influenza, Seasonal, Quadrivalent, Adjuvanted 10/28/2021    Pfizer Gray Cap SARS-CoV-2 07/19/2022    Pneumococcal polysaccharide vaccine, 23-valent, age 2 years and older (PNEUMOVAX 23) 01/28/2022       Review of Systems    No visits with results within 4 Month(s) from this visit.   Latest known visit with results is:   Legacy Encounter on 09/06/2023   Component Date Value Ref Range Status    Pathology Report 09/06/2023    Final                    Value:Name MANJIT REYES                                                                                                   Accession #: [id number]            Pathologist:                   ABHIJIT SZYMANSKI MD  Date of Procedure:    9/6/2023  Date Received:          9/7/2023  Date Reported           9/8/2023  Submitting Physician:   UBALDO DALY DO  Location:                    [facility]  Other External #                                                                    FINAL DIAGNOSIS  SKIN, R NASAL ALA, SHAVE BIOPSY:  BASAL CELL CARCINOMA, NODULAR GROWTH PATTERN, PRESENT ON THE DEEP MARGIN.                  **Electronically Signed Out by ABHIJIT SZYMANSKI M.D.**                                                                                                                                                                                                                                                                                                                                                                                                                                                                                                                  Electronically Signed Out By ABHIJIT SZYMANSKI MD/[facility]  By the signature on this report, the individual or group listed as making the  Final Interpretation/Diagnosis certifies that they have reviewed this case.  Diagnostic interpretation performed at  [facility] [address] [place] [address],  [place] [id number]           Microscopic Description:  Microscopic analysis shows a discrete nodule of tumor that is associated with  the epidermis. The carcinoma is composed of bland basaloid keratinocytes with  peripheral palisaded arrangement of nuclei.      Clinical History:  R/O BCC.  Shave biopsy.  ([facility])     Specimens Submitted As:  A: SKIN, R NASAL ALA     Gross Description:  Received in formalin is one tan-brown piece of skin measuring 4 x 3 x 1 mm.   Inked and embedded in toto.      [facility]/9/7/2023               [facility]                            [facility] [address]        CONVERTED FINAL DIAGNOSIS 09/06/2023    Final                    Value:SKIN, R NASAL ALA, SHAVE BIOPSY:  BASAL CELL CARCINOMA, NODULAR GROWTH PATTERN, PRESENT ON THE DEEP MARGIN.                  **Electronically Signed Out by ABHIJIT SZYMANSKI M.D.**        CONVERTED CLINICAL DIAGNOSIS-HISTO* 09/06/2023 R/O BCC.  Shave biopsy.  ([facility])   Final    CONVERTED GROSS DESCRIPTION 09/06/2023    Final                    Value:Received in formalin is one tan-brown piece of skin measuring 4 x 3 x 1 mm.   Inked and embedded in toto.        CONVERTED MICROSCOPIC DESCRIPTION 09/06/2023    Final                    Value:Microscopic analysis shows a discrete nodule of tumor that is associated with  the epidermis. The carcinoma is composed of bland basaloid keratinocytes with  peripheral palisaded arrangement of nuclei.        CONVERTED FINAL REPORT PDF LINK TO* 09/06/2023 \\copathshare\copath\PDF [id number]   Final       Radiology: Reviewed  imaging in powerchart.  Imaging  No results found.    Cardiology, Vascular, and Other Imaging  No other imaging results found for the past 7 days      Family History[3]  Social History[4]  Medical History[5]  Surgical History[6]    Charting was completed using voice recognition technology and may include unintended errors.            [1] No Known Allergies  [2]   Current Outpatient Medications   Medication Sig Dispense Refill    amLODIPine (Norvasc) 5 mg tablet Take 1 tablet (5 mg) by mouth once daily. 90 tablet 1    citalopram (CeleXA) 20 mg tablet Take 1 tablet (20 mg) by mouth once daily. 90 tablet 1    pantoprazole (ProtoNix) 40 mg EC tablet Take 1 tablet (40 mg) by mouth once daily. Do not crush, chew, or split. 90 tablet 1    albuterol 90 mcg/actuation inhaler Inhale 2 puffs every 6 hours if needed. (Patient not taking: Reported on 5/22/2025)       No current facility-administered medications for this visit.   [3]   Family History  Problem Relation Name Age of Onset    Ovarian cancer Sister     [4]   Social History  Socioeconomic History    Marital status:    Tobacco Use    Smoking status: Never    Smokeless tobacco: Never   Substance and Sexual Activity    Alcohol use: Not Currently    Drug use: Never   [5]   Past Medical History:  Diagnosis Date    Other intervertebral disc degeneration, lumbosacral region     DDD (degenerative disc disease), lumbosacral    Personal history of colonic polyps     History of colonic polyps    Personal history of diseases of the blood and blood-forming organs and certain disorders involving the immune mechanism     History of thalassemia    Personal history of other diseases of the circulatory system     History of valvular heart disease    Personal history of other diseases of the nervous system and sense organs     History of sciatica    Personal history of other specified conditions 03/25/2015    History of vertigo    Personal history of urinary (tract) infections  11/10/2015    History of urinary tract infection    Primary osteoarthritis, right ankle and foot     Osteoarthritis of both feet   [6]   Past Surgical History:  Procedure Laterality Date    CHOLECYSTECTOMY  03/13/2015    Cholecystectomy Laparoscopic    COLONOSCOPY  08/29/2018    Complete Colonoscopy    OTHER SURGICAL HISTORY  08/29/2018    Upper Gastrointestinal Endoscopy, Simple Primary Exam    OTHER SURGICAL HISTORY  08/03/2015    Dental Surgery    TOTAL ABDOMINAL HYSTERECTOMY  08/03/2015    Total Abdominal Hysterectomy

## 2025-05-23 LAB
ANION GAP SERPL CALCULATED.4IONS-SCNC: 8 MMOL/L (CALC) (ref 7–17)
BASOPHILS # BLD AUTO: 42 CELLS/UL (ref 0–200)
BASOPHILS NFR BLD AUTO: 0.8 %
BUN SERPL-MCNC: 14 MG/DL (ref 7–25)
BUN/CREAT SERPL: NORMAL (CALC) (ref 6–22)
CALCIUM SERPL-MCNC: 9.1 MG/DL (ref 8.6–10.4)
CHLORIDE SERPL-SCNC: 103 MMOL/L (ref 98–110)
CO2 SERPL-SCNC: 25 MMOL/L (ref 20–32)
CREAT SERPL-MCNC: 0.75 MG/DL (ref 0.6–1)
EGFRCR SERPLBLD CKD-EPI 2021: 86 ML/MIN/1.73M2
EOSINOPHIL # BLD AUTO: 42 CELLS/UL (ref 15–500)
EOSINOPHIL NFR BLD AUTO: 0.8 %
ERYTHROCYTE [DISTWIDTH] IN BLOOD BY AUTOMATED COUNT: 17.8 % (ref 11–15)
GLUCOSE SERPL-MCNC: 82 MG/DL (ref 65–99)
HCT VFR BLD AUTO: 38.2 % (ref 35–45)
HGB BLD-MCNC: 11 G/DL (ref 11.7–15.5)
LYMPHOCYTES # BLD AUTO: 1747 CELLS/UL (ref 850–3900)
LYMPHOCYTES NFR BLD AUTO: 33.6 %
MCH RBC QN AUTO: 18.3 PG (ref 27–33)
MCHC RBC AUTO-ENTMCNC: 28.8 G/DL (ref 32–36)
MCV RBC AUTO: 63.7 FL (ref 80–100)
MONOCYTES # BLD AUTO: 291 CELLS/UL (ref 200–950)
MONOCYTES NFR BLD AUTO: 5.6 %
MORPHOLOGY BLD-IMP: ABNORMAL
NEUTROPHILS # BLD AUTO: 3078 CELLS/UL (ref 1500–7800)
NEUTROPHILS NFR BLD AUTO: 59.2 %
PLATELET # BLD AUTO: 345 THOUSAND/UL (ref 140–400)
PMV BLD REES-ECKER: 10.8 FL (ref 7.5–12.5)
POTASSIUM SERPL-SCNC: 4.2 MMOL/L (ref 3.5–5.3)
RBC # BLD AUTO: 6 MILLION/UL (ref 3.8–5.1)
SODIUM SERPL-SCNC: 136 MMOL/L (ref 135–146)
WBC # BLD AUTO: 5.2 THOUSAND/UL (ref 3.8–10.8)

## 2025-06-26 ENCOUNTER — APPOINTMENT (OUTPATIENT)
Dept: PRIMARY CARE | Facility: CLINIC | Age: 70
End: 2025-06-26
Payer: MEDICARE

## 2025-08-01 ENCOUNTER — APPOINTMENT (OUTPATIENT)
Dept: PRIMARY CARE | Facility: CLINIC | Age: 70
End: 2025-08-01
Payer: MEDICARE

## 2025-08-01 VITALS
BODY MASS INDEX: 31.76 KG/M2 | HEIGHT: 64 IN | DIASTOLIC BLOOD PRESSURE: 80 MMHG | OXYGEN SATURATION: 98 % | SYSTOLIC BLOOD PRESSURE: 142 MMHG | WEIGHT: 186 LBS | HEART RATE: 59 BPM | TEMPERATURE: 97.4 F

## 2025-08-01 DIAGNOSIS — F33.2 DEPRESSION, ENDOGENOUS (MULTI): ICD-10-CM

## 2025-08-01 DIAGNOSIS — I10 HYPERTENSION, BENIGN: ICD-10-CM

## 2025-08-01 DIAGNOSIS — D50.9 IRON DEFICIENCY ANEMIA, UNSPECIFIED IRON DEFICIENCY ANEMIA TYPE: ICD-10-CM

## 2025-08-01 DIAGNOSIS — E78.2 MIXED HYPERLIPIDEMIA: ICD-10-CM

## 2025-08-01 DIAGNOSIS — Z00.00 VISIT FOR PREVENTIVE HEALTH EXAMINATION: Primary | ICD-10-CM

## 2025-08-01 PROCEDURE — 3079F DIAST BP 80-89 MM HG: CPT | Performed by: FAMILY MEDICINE

## 2025-08-01 PROCEDURE — 3077F SYST BP >= 140 MM HG: CPT | Performed by: FAMILY MEDICINE

## 2025-08-01 PROCEDURE — 99497 ADVNCD CARE PLAN 30 MIN: CPT | Performed by: FAMILY MEDICINE

## 2025-08-01 PROCEDURE — G0444 DEPRESSION SCREEN ANNUAL: HCPCS | Performed by: FAMILY MEDICINE

## 2025-08-01 PROCEDURE — 1159F MED LIST DOCD IN RCRD: CPT | Performed by: FAMILY MEDICINE

## 2025-08-01 PROCEDURE — G0439 PPPS, SUBSEQ VISIT: HCPCS | Performed by: FAMILY MEDICINE

## 2025-08-01 PROCEDURE — 3008F BODY MASS INDEX DOCD: CPT | Performed by: FAMILY MEDICINE

## 2025-08-01 PROCEDURE — 99214 OFFICE O/P EST MOD 30 MIN: CPT | Performed by: FAMILY MEDICINE

## 2025-08-01 PROCEDURE — 1160F RVW MEDS BY RX/DR IN RCRD: CPT | Performed by: FAMILY MEDICINE

## 2025-08-01 PROCEDURE — 90677 PCV20 VACCINE IM: CPT | Performed by: FAMILY MEDICINE

## 2025-08-01 PROCEDURE — 1036F TOBACCO NON-USER: CPT | Performed by: FAMILY MEDICINE

## 2025-08-01 PROCEDURE — 1170F FXNL STATUS ASSESSED: CPT | Performed by: FAMILY MEDICINE

## 2025-08-01 PROCEDURE — G0009 ADMIN PNEUMOCOCCAL VACCINE: HCPCS | Performed by: FAMILY MEDICINE

## 2025-08-01 ASSESSMENT — ACTIVITIES OF DAILY LIVING (ADL)
BATHING: INDEPENDENT
MANAGING_FINANCES: INDEPENDENT
GROCERY_SHOPPING: INDEPENDENT
DOING_HOUSEWORK: INDEPENDENT
TAKING_MEDICATION: INDEPENDENT
DRESSING: INDEPENDENT

## 2025-08-01 NOTE — PROGRESS NOTES
Subjective   Patient ID: Zoe Rousseau is a 69 y.o. female who presents for Medicare Annual Wellness Visit Subsequent (Pt is fasting ).    Assessment/Plan     Problem List Items Addressed This Visit       Depression, endogenous (Multi)    Hyperlipidemia    Relevant Orders    TSH with reflex to Free T4 if abnormal    Lipid Panel    Comprehensive Metabolic Panel    CBC    Urinalysis with Reflex Microscopic    Hypertension, benign    Relevant Orders    TSH with reflex to Free T4 if abnormal    Lipid Panel    Comprehensive Metabolic Panel    CBC    Urinalysis with Reflex Microscopic    Visit for preventive health examination - Primary    Relevant Orders    TSH with reflex to Free T4 if abnormal    Lipid Panel    Comprehensive Metabolic Panel    CBC    Urinalysis with Reflex Microscopic    Pneumococcal conjugate vaccine, 20-valent (PREVNAR 20)     Other Visit Diagnoses         Iron deficiency anemia, unspecified iron deficiency anemia type        Relevant Orders    Ferritin    Iron and TIBC          Time spent around 10 minutes obtaining and discussing depression screening using PHQ 9 questions with results documented in the chart  Declined flu vaccine    Nuclear stress test done in July 2022 no acute abnormality low risk scan  Discussed about weight loss  Mammogram done in October 2024  DEXA scan requisition provided  Previously patient did not go    Discussed briefly about rectocele patient can take Metamucil if not improving patient is to see urology or her gynecologist     Discussed about diet exercise weight loss  Pneumovax on previous visit  Shingles vaccine advised to get it at pharmacy after getting covid vaccine    Bone density March 2021 showed osteopenia  History of colonoscopy in 2020 by Dr. coles had a AV malformation status post ablation  Chronic lower back pain  Follow-up in 6-12 months come back early with any new signs and symptoms. Patient understands plan.    HPI    69-year-old female here  "here for Medicare wellness visit and follow-up on chronic medical conditions    No new sudden symptoms except constipation  IBS with constipation- advise high fiber -diet - metamucil  ? Pt feels prolapse    Advised patient to follow-up with Dr. Curry get EGD colonoscopy    Stop amlodipine olmesartan secondary to side effect nausea  Only on amlodipine 5 mg  Will provide refillPrevious nuclear stress test within normal limits  Spirometry shows possible restriction       Hypertension  Obesity BMI 31  Prediabetes   Anxiety   Hyperlipidemia   Vitamin D deficiency     Chronic low back pain     Following up with OB/GYN.  Planing of on and off chronic lower back pain without radiation no history of injury or trauma no weakness noting and numbness no bowel or bladder incontinence     EGD colonoscopy done in 2020 with Dr. Huang    I spent >16 minutes discussing Advance Care Planning including the explanation and discussion of advance directives. If patient does not have current up to date documents, examples and information provided on how to create both Living Will and Power of . Patient was encouraged to work on completing these documents. . Patient has living will and her  is healthcare power of . Patient is full          No Known Allergies    Current Outpatient Medications   Medication Sig Dispense Refill    amLODIPine (Norvasc) 5 mg tablet Take 1 tablet (5 mg) by mouth once daily. 90 tablet 1    citalopram (CeleXA) 20 mg tablet Take 1 tablet (20 mg) by mouth once daily. 90 tablet 1    pantoprazole (ProtoNix) 40 mg EC tablet Take 1 tablet (40 mg) by mouth once daily. Do not crush, chew, or split. 90 tablet 1     No current facility-administered medications for this visit.       Objective   Visit Vitals  /80 (BP Location: Left arm, Patient Position: Sitting)   Pulse 59   Temp 36.3 °C (97.4 °F)   Ht 1.626 m (5' 4\")   Wt 84.4 kg (186 lb)   SpO2 98%   BMI 31.93 kg/m²   OB Status " Postmenopausal   Smoking Status Never   BSA 1.95 m²     Physical Exam  Constitutional:       General: He is not in acute distress.     Appearance: Normal appearance.   HENT:      Head: Normocephalic and atraumatic.      Nose: Nose normal.   Eyes:      Extraocular Movements: Extraocular movements intact.      Conjunctiva/sclera: Conjunctivae normal.   Cardiovascular:      Rate and Rhythm: Normal rate and regular rhythm.   Pulmonary:      Effort: Pulmonary effort is normal.      Breath sounds: Normal breath sounds.   Skin:     General: Skin is warm.   Neurological:      Mental Status: He is alert and oriented to person, place, and time.   Psychiatric:         Mood and Affect: Mood normal.         Behavior: Behavior normal.   Immunization History   Administered Date(s) Administered    COVID-19, mRNA, LNP-S, PF, 30 mcg/0.3 mL dose 01/13/2021, 02/03/2021, 10/01/2021    Flu vaccine, quadrivalent, high-dose, preservative free, age 65y+ (FLUZONE) 10/12/2022    Flu vaccine, trivalent, preservative free, age 6 months and greater (Fluarix/Fluzone/Flulaval) 10/03/2014, 10/01/2015    Influenza, Seasonal, Quadrivalent, Adjuvanted 10/28/2021    Pfizer Gray Cap SARS-CoV-2 07/19/2022    Pneumococcal polysaccharide vaccine, 23-valent, age 2 years and older (PNEUMOVAX 23) 01/28/2022       Review of Systems    Office Visit on 05/22/2025   Component Date Value Ref Range Status    WHITE BLOOD CELL COUNT 05/22/2025 5.2  3.8 - 10.8 Thousand/uL Final    RED BLOOD CELL COUNT 05/22/2025 6.00 (H)  3.80 - 5.10 Million/uL Final    HEMOGLOBIN 05/22/2025 11.0 (L)  11.7 - 15.5 g/dL Final    HEMATOCRIT 05/22/2025 38.2  35.0 - 45.0 % Final    MCV 05/22/2025 63.7 (L)  80.0 - 100.0 fL Final    MCH 05/22/2025 18.3 (L)  27.0 - 33.0 pg Final    MCHC 05/22/2025 28.8 (L)  32.0 - 36.0 g/dL Final    RDW 05/22/2025 17.8 (H)  11.0 - 15.0 % Final    PLATELET COUNT 05/22/2025 345  140 - 400 Thousand/uL Final    MPV 05/22/2025 10.8  7.5 - 12.5 fL Final    ABSOLUTE  NEUTROPHILS 05/22/2025 3,078  1,500 - 7,800 cells/uL Final    ABSOLUTE LYMPHOCYTES 05/22/2025 1,747  850 - 3,900 cells/uL Final    ABSOLUTE MONOCYTES 05/22/2025 291  200 - 950 cells/uL Final    ABSOLUTE EOSINOPHILS 05/22/2025 42  15 - 500 cells/uL Final    ABSOLUTE BASOPHILS 05/22/2025 42  0 - 200 cells/uL Final    NEUTROPHILS 05/22/2025 59.2  % Final    LYMPHOCYTES 05/22/2025 33.6  % Final    MONOCYTES 05/22/2025 5.6  % Final    EOSINOPHILS 05/22/2025 0.8  % Final    BASOPHILS 05/22/2025 0.8  % Final    CBC MORPHOLOGY 05/22/2025    Final    GLUCOSE 05/22/2025 82  65 - 99 mg/dL Final    UREA NITROGEN (BUN) 05/22/2025 14  7 - 25 mg/dL Final    CREATININE 05/22/2025 0.75  0.60 - 1.00 mg/dL Final    EGFR 05/22/2025 86  > OR = 60 mL/min/1.73m2 Final    BUN/CREATININE RATIO 05/22/2025 SEE NOTE:  6 - 22 (calc) Final    SODIUM 05/22/2025 136  135 - 146 mmol/L Final    POTASSIUM 05/22/2025 4.2  3.5 - 5.3 mmol/L Final    CHLORIDE 05/22/2025 103  98 - 110 mmol/L Final    CARBON DIOXIDE 05/22/2025 25  20 - 32 mmol/L Final    ELECTROLYTE BALANCE 05/22/2025 8  7 - 17 mmol/L (calc) Final    CALCIUM 05/22/2025 9.1  8.6 - 10.4 mg/dL Final       Radiology: Reviewed imaging in powerchart.  No results found.    Family History   Problem Relation Name Age of Onset    Ovarian cancer Sister       Social History     Socioeconomic History    Marital status:    Tobacco Use    Smoking status: Never    Smokeless tobacco: Never   Substance and Sexual Activity    Alcohol use: Not Currently    Drug use: Never     Past Medical History:   Diagnosis Date    Melanoma (Multi)     Other intervertebral disc degeneration, lumbosacral region     DDD (degenerative disc disease), lumbosacral    Personal history of colonic polyps     History of colonic polyps    Personal history of diseases of the blood and blood-forming organs and certain disorders involving the immune mechanism     History of thalassemia    Personal history of other diseases of the  circulatory system     History of valvular heart disease    Personal history of other diseases of the nervous system and sense organs     History of sciatica    Personal history of other specified conditions 03/25/2015    History of vertigo    Personal history of urinary (tract) infections 11/10/2015    History of urinary tract infection    Primary osteoarthritis, right ankle and foot     Osteoarthritis of both feet     Past Surgical History:   Procedure Laterality Date    CHOLECYSTECTOMY  03/13/2015    Cholecystectomy Laparoscopic    COLONOSCOPY  08/29/2018    Complete Colonoscopy    MOHS SURGERY  2019 maybe    OTHER SURGICAL HISTORY  08/29/2018    Upper Gastrointestinal Endoscopy, Simple Primary Exam    OTHER SURGICAL HISTORY  08/03/2015    Dental Surgery    TOTAL ABDOMINAL HYSTERECTOMY  08/03/2015    Total Abdominal Hysterectomy       Charting was completed using voice recognition technology and may include unintended errors.

## 2025-08-02 LAB
ALBUMIN SERPL-MCNC: 4.2 G/DL (ref 3.6–5.1)
ALP SERPL-CCNC: 97 U/L (ref 37–153)
ALT SERPL-CCNC: 11 U/L (ref 6–29)
ANION GAP SERPL CALCULATED.4IONS-SCNC: 10 MMOL/L (CALC) (ref 7–17)
APPEARANCE UR: CLEAR
AST SERPL-CCNC: 13 U/L (ref 10–35)
BACTERIA #/AREA URNS HPF: ABNORMAL /HPF
BILIRUB SERPL-MCNC: 0.7 MG/DL (ref 0.2–1.2)
BILIRUB UR QL STRIP: NEGATIVE
BUN SERPL-MCNC: 15 MG/DL (ref 7–25)
CALCIUM SERPL-MCNC: 9.3 MG/DL (ref 8.6–10.4)
CHLORIDE SERPL-SCNC: 103 MMOL/L (ref 98–110)
CHOLEST SERPL-MCNC: 184 MG/DL
CHOLEST/HDLC SERPL: 3.9 (CALC)
CO2 SERPL-SCNC: 25 MMOL/L (ref 20–32)
COLOR UR: YELLOW
CREAT SERPL-MCNC: 0.76 MG/DL (ref 0.6–1)
EGFRCR SERPLBLD CKD-EPI 2021: 84 ML/MIN/1.73M2
ERYTHROCYTE [DISTWIDTH] IN BLOOD BY AUTOMATED COUNT: 17.8 % (ref 11–15)
FERRITIN SERPL-MCNC: 118 NG/ML (ref 16–288)
GLUCOSE SERPL-MCNC: 102 MG/DL (ref 65–99)
GLUCOSE UR QL STRIP: NEGATIVE
HCT VFR BLD AUTO: 39.9 % (ref 35–45)
HDLC SERPL-MCNC: 47 MG/DL
HGB BLD-MCNC: 11.8 G/DL (ref 11.7–15.5)
HGB UR QL STRIP: NEGATIVE
HYALINE CASTS #/AREA URNS LPF: ABNORMAL /LPF
IRON SATN MFR SERPL: 32 % (CALC) (ref 16–45)
IRON SERPL-MCNC: 98 MCG/DL (ref 45–160)
KETONES UR QL STRIP: NEGATIVE
LDLC SERPL CALC-MCNC: 117 MG/DL (CALC)
LEUKOCYTE ESTERASE UR QL STRIP: ABNORMAL
MCH RBC QN AUTO: 18.8 PG (ref 27–33)
MCHC RBC AUTO-ENTMCNC: 29.6 G/DL (ref 32–36)
MCV RBC AUTO: 63.7 FL (ref 80–100)
NITRITE UR QL STRIP: NEGATIVE
NONHDLC SERPL-MCNC: 137 MG/DL (CALC)
PH UR STRIP: 6 [PH] (ref 5–8)
PLATELET # BLD AUTO: 290 THOUSAND/UL (ref 140–400)
PMV BLD REES-ECKER: 9.8 FL (ref 7.5–12.5)
POTASSIUM SERPL-SCNC: 4.6 MMOL/L (ref 3.5–5.3)
PROT SERPL-MCNC: 6.7 G/DL (ref 6.1–8.1)
PROT UR QL STRIP: NEGATIVE
RBC # BLD AUTO: 6.26 MILLION/UL (ref 3.8–5.1)
RBC #/AREA URNS HPF: ABNORMAL /HPF
SERVICE CMNT-IMP: ABNORMAL
SODIUM SERPL-SCNC: 138 MMOL/L (ref 135–146)
SP GR UR STRIP: 1.01 (ref 1–1.03)
SQUAMOUS #/AREA URNS HPF: ABNORMAL /HPF
TIBC SERPL-MCNC: 303 MCG/DL (CALC) (ref 250–450)
TRIGL SERPL-MCNC: 102 MG/DL
TSH SERPL-ACNC: 0.71 MIU/L (ref 0.4–4.5)
WBC # BLD AUTO: 5.9 THOUSAND/UL (ref 3.8–10.8)
WBC #/AREA URNS HPF: ABNORMAL /HPF